# Patient Record
Sex: MALE | Race: OTHER | HISPANIC OR LATINO | URBAN - METROPOLITAN AREA
[De-identification: names, ages, dates, MRNs, and addresses within clinical notes are randomized per-mention and may not be internally consistent; named-entity substitution may affect disease eponyms.]

---

## 2021-02-01 ENCOUNTER — EMERGENCY (EMERGENCY)
Facility: HOSPITAL | Age: 41
LOS: 1 days | Discharge: HOME | End: 2021-02-01
Attending: EMERGENCY MEDICINE | Admitting: EMERGENCY MEDICINE
Payer: COMMERCIAL

## 2021-02-01 VITALS
SYSTOLIC BLOOD PRESSURE: 183 MMHG | TEMPERATURE: 97 F | HEART RATE: 63 BPM | RESPIRATION RATE: 18 BRPM | WEIGHT: 190.04 LBS | DIASTOLIC BLOOD PRESSURE: 121 MMHG | OXYGEN SATURATION: 99 %

## 2021-02-01 LAB
ALBUMIN SERPL ELPH-MCNC: 4.1 G/DL — SIGNIFICANT CHANGE UP (ref 3.3–5)
ALP SERPL-CCNC: 61 U/L — SIGNIFICANT CHANGE UP (ref 40–120)
ALT FLD-CCNC: 40 U/L — SIGNIFICANT CHANGE UP (ref 12–78)
ANION GAP SERPL CALC-SCNC: 6 MMOL/L — SIGNIFICANT CHANGE UP (ref 5–17)
AST SERPL-CCNC: 15 U/L — SIGNIFICANT CHANGE UP (ref 15–37)
BASOPHILS # BLD AUTO: 0.05 K/UL — SIGNIFICANT CHANGE UP (ref 0–0.2)
BASOPHILS NFR BLD AUTO: 1 % — SIGNIFICANT CHANGE UP (ref 0–2)
BILIRUB SERPL-MCNC: 0.7 MG/DL — SIGNIFICANT CHANGE UP (ref 0.2–1.2)
BUN SERPL-MCNC: 12 MG/DL — SIGNIFICANT CHANGE UP (ref 7–23)
CALCIUM SERPL-MCNC: 8.8 MG/DL — SIGNIFICANT CHANGE UP (ref 8.5–10.1)
CHLORIDE SERPL-SCNC: 108 MMOL/L — SIGNIFICANT CHANGE UP (ref 96–108)
CK SERPL-CCNC: 84 U/L — SIGNIFICANT CHANGE UP (ref 26–308)
CO2 SERPL-SCNC: 27 MMOL/L — SIGNIFICANT CHANGE UP (ref 22–31)
CREAT SERPL-MCNC: 1 MG/DL — SIGNIFICANT CHANGE UP (ref 0.5–1.3)
D DIMER BLD IA.RAPID-MCNC: <150 NG/ML DDU — SIGNIFICANT CHANGE UP
EOSINOPHIL # BLD AUTO: 0.22 K/UL — SIGNIFICANT CHANGE UP (ref 0–0.5)
EOSINOPHIL NFR BLD AUTO: 4.3 % — SIGNIFICANT CHANGE UP (ref 0–6)
GLUCOSE SERPL-MCNC: 88 MG/DL — SIGNIFICANT CHANGE UP (ref 70–99)
HCT VFR BLD CALC: 46.1 % — SIGNIFICANT CHANGE UP (ref 39–50)
HGB BLD-MCNC: 15.1 G/DL — SIGNIFICANT CHANGE UP (ref 13–17)
IMM GRANULOCYTES NFR BLD AUTO: 0.2 % — SIGNIFICANT CHANGE UP (ref 0–1.5)
LIDOCAIN IGE QN: 84 U/L — SIGNIFICANT CHANGE UP (ref 73–393)
LYMPHOCYTES # BLD AUTO: 2.06 K/UL — SIGNIFICANT CHANGE UP (ref 1–3.3)
LYMPHOCYTES # BLD AUTO: 40 % — SIGNIFICANT CHANGE UP (ref 13–44)
MCHC RBC-ENTMCNC: 27.7 PG — SIGNIFICANT CHANGE UP (ref 27–34)
MCHC RBC-ENTMCNC: 32.8 GM/DL — SIGNIFICANT CHANGE UP (ref 32–36)
MCV RBC AUTO: 84.6 FL — SIGNIFICANT CHANGE UP (ref 80–100)
MONOCYTES # BLD AUTO: 0.38 K/UL — SIGNIFICANT CHANGE UP (ref 0–0.9)
MONOCYTES NFR BLD AUTO: 7.4 % — SIGNIFICANT CHANGE UP (ref 2–14)
NEUTROPHILS # BLD AUTO: 2.43 K/UL — SIGNIFICANT CHANGE UP (ref 1.8–7.4)
NEUTROPHILS NFR BLD AUTO: 47.1 % — SIGNIFICANT CHANGE UP (ref 43–77)
NRBC # BLD: 0 /100 WBCS — SIGNIFICANT CHANGE UP (ref 0–0)
PLATELET # BLD AUTO: 236 K/UL — SIGNIFICANT CHANGE UP (ref 150–400)
POTASSIUM SERPL-MCNC: 3.7 MMOL/L — SIGNIFICANT CHANGE UP (ref 3.5–5.3)
POTASSIUM SERPL-SCNC: 3.7 MMOL/L — SIGNIFICANT CHANGE UP (ref 3.5–5.3)
PROT SERPL-MCNC: 7.6 G/DL — SIGNIFICANT CHANGE UP (ref 6–8.3)
RBC # BLD: 5.45 M/UL — SIGNIFICANT CHANGE UP (ref 4.2–5.8)
RBC # FLD: 14 % — SIGNIFICANT CHANGE UP (ref 10.3–14.5)
SODIUM SERPL-SCNC: 141 MMOL/L — SIGNIFICANT CHANGE UP (ref 135–145)
TROPONIN I SERPL-MCNC: <.015 NG/ML — SIGNIFICANT CHANGE UP (ref 0.01–0.04)
TROPONIN I SERPL-MCNC: <.015 NG/ML — SIGNIFICANT CHANGE UP (ref 0.01–0.04)
TSH SERPL-MCNC: 3.9 UIU/ML — HIGH (ref 0.36–3.74)
WBC # BLD: 5.15 K/UL — SIGNIFICANT CHANGE UP (ref 3.8–10.5)
WBC # FLD AUTO: 5.15 K/UL — SIGNIFICANT CHANGE UP (ref 3.8–10.5)

## 2021-02-01 PROCEDURE — 71045 X-RAY EXAM CHEST 1 VIEW: CPT | Mod: 26

## 2021-02-01 PROCEDURE — 99285 EMERGENCY DEPT VISIT HI MDM: CPT

## 2021-02-01 PROCEDURE — 93010 ELECTROCARDIOGRAM REPORT: CPT | Mod: 76

## 2021-02-01 RX ORDER — ASPIRIN/CALCIUM CARB/MAGNESIUM 324 MG
325 TABLET ORAL ONCE
Refills: 0 | Status: COMPLETED | OUTPATIENT
Start: 2021-02-01 | End: 2021-02-01

## 2021-02-01 RX ORDER — AMLODIPINE BESYLATE 2.5 MG/1
5 TABLET ORAL ONCE
Refills: 0 | Status: COMPLETED | OUTPATIENT
Start: 2021-02-01 | End: 2021-02-01

## 2021-02-01 RX ORDER — HYDRALAZINE HCL 50 MG
10 TABLET ORAL ONCE
Refills: 0 | Status: COMPLETED | OUTPATIENT
Start: 2021-02-01 | End: 2021-02-01

## 2021-02-01 RX ADMIN — AMLODIPINE BESYLATE 5 MILLIGRAM(S): 2.5 TABLET ORAL at 18:24

## 2021-02-01 RX ADMIN — Medication 325 MILLIGRAM(S): at 23:49

## 2021-02-01 RX ADMIN — Medication 10 MILLIGRAM(S): at 23:48

## 2021-02-01 RX ADMIN — AMLODIPINE BESYLATE 5 MILLIGRAM(S): 2.5 TABLET ORAL at 20:16

## 2021-02-01 NOTE — ED PROVIDER NOTE - NSFOLLOWUPINSTRUCTIONS_ED_ALL_ED_FT
Chest Pain    Chest pain can be caused by many different conditions which may or may not be dangerous. Causes include heartburn, lung infections, heart attack, blood clot in lungs, skin infections, strain or damage to muscle, cartilage, or bones, etc. In addition to a history and physical examination, an electrocardiogram (ECG) or other lab tests may have been performed to determine the cause of your chest pain. Follow up with your primary care provider or with a cardiologist as instructed.     SEEK IMMEDIATE MEDICAL CARE IF YOU HAVE ANY OF THE FOLLOWING SYMPTOMS: worsening chest pain, coughing up blood, unexplained back/neck/jaw pain, severe abdominal pain, dizziness or lightheadedness, fainting, shortness of breath, sweaty or clammy skin, vomiting, or racing heart beat. These symptoms may represent a serious problem that is an emergency. Do not wait to see if the symptoms will go away. Get medical help right away. Call 911 and do not drive yourself to the hospital.     Please return to Emergency room immediately for any worsening or persistent pain, shortness of breath, weakness, fever, abdominal pain, dizziness, passing out, unexplained pain in arms, legs, back, any vomiting, feeling like your heart is racing,  or any other concerning symptoms.    Follow up with Dr Siegel, Cardiologist within 1 week.  Take Amlodipine 10mg once daily for high blood pressure.

## 2021-02-01 NOTE — ED ADULT NURSE NOTE - NSIMPLEMENTINTERV_GEN_ALL_ED
Implemented All Universal Safety Interventions:  [place] to call system. Call bell, personal items and telephone within reach. Instruct patient to call for assistance. Room bathroom lighting operational. Non-slip footwear when patient is off stretcher. Physically safe environment: no spills, clutter or unnecessary equipment. Stretcher in lowest position, wheels locked, appropriate side rails in place.

## 2021-02-01 NOTE — ED PROVIDER NOTE - PATIENT PORTAL LINK FT
You can access the FollowMyHealth Patient Portal offered by [facility] by registering at the following website: [email or web address]. By joining [employer]’s FollowMyHealth portal, you will also be able to view your health information using other applications (apps) compatible with our system.

## 2021-02-01 NOTE — ED PROVIDER NOTE - PROGRESS NOTE DETAILS
pt with continued elevated blood pressures and although improved still with chest discomfort. Will continue to control BP. Will hold for am cardiology order. d/w dr myers cardiology,  pt clear for discharge, start pt on Amlodipine 10mg QD and will see pt in office.

## 2021-02-01 NOTE — ED ADULT NURSE REASSESSMENT NOTE - NS ED NURSE REASSESS COMMENT FT1
Report received from previous RN Carlos A. Pt resting comfortably, on telemetry monitor. Pt states the pain in his arm subsided. Will continue to monitor.

## 2021-02-01 NOTE — ED PROVIDER NOTE - PMH
Health Maintenance Summary     Topic Due On Due Status Completed On    Osteoporosis Screening  Completed Dec 18, 2015    Immunization-Zoster  Completed Aug 28, 2015    Immunization - Pneumococcal  Completed Jul 21, 2015    Medicare Wellness Visit Jul 22, 2017 Due On Jul 22, 2016    IMMUNIZATION - DTaP/Tdap/Td Jun 27, 2023 Not Due Jun 27, 2013    Immunization-Influenza Sep 1, 2017 Not Due Oct 1, 2015          Patient is up to date, no discussion needed. Doing it today       Renal colic

## 2021-02-01 NOTE — ED PROVIDER NOTE - OBJECTIVE STATEMENT
Pt is a 41 yo male who presents to the ED with a cc of chest pain. PMHx of seizure disorder as a child, h/o smoking. Pt reports that on Friday evening he developed chest pain. he reports that the pain is achy in nature and radiates into his left arm down to his elbow. The pain has been intermittent since then but today's episode occurred around 11 am and has been present since. He reports that walking actually seems to help the pain. Denies fever, chills, N/V/D/C, SOB, abd pain. Pt denies pleuritic component. He reports that he has no known underlying cardiac pathology but that there is a strong family history of cardiac disease on his mother's side. He has not seen a cardiologist. Denies trauma to the chest wall

## 2021-02-01 NOTE — ED PROVIDER NOTE - CLINICAL SUMMARY MEDICAL DECISION MAKING FREE TEXT BOX
Pt is a 39 yo male who presents to the ED with a cc of chest pain. PMHx of seizure disorder as a child, h/o smoking. Pt reports that on Friday evening he developed chest pain. he reports that the pain is achy in nature and radiates into his left arm down to his elbow. The pain has been intermittent since then but today's episode occurred around 11 am and has been present since. He reports that walking actually seems to help the pain. Denies fever, chills, N/V/D/C, SOB, abd pain. Pt denies pleuritic component. He reports that he has no known underlying cardiac pathology but that there is a strong family history of cardiac disease on his mother's side. He has not seen a cardiologist. Denies trauma to the chest wall Pt is a 39 yo male who presents to the ED with a cc of chest pain. PMHx of seizure disorder as a child, h/o smoking. Pt reports that on Friday evening he developed chest pain. he reports that the pain is achy in nature and radiates into his left arm down to his elbow. The pain has been intermittent since then but today's episode occurred around 11 am and has been present since. He reports that walking actually seems to help the pain. Denies fever, chills, N/V/D/C, SOB, abd pain. Pt denies pleuritic component. He reports that he has no known underlying cardiac pathology but that there is a strong family history of cardiac disease on his mother's side. He has not seen a cardiologist. Denies trauma to the chest wall. Concern for underlying cardiac pathology. Will obtain screening labs, troponin, check d dimer, EKG, chest x-ray. Will control BP

## 2021-02-01 NOTE — ED PROVIDER NOTE - CARE PLAN
Principal Discharge DX:	Hypertensive urgency   Principal Discharge DX:	Hypertensive urgency  Secondary Diagnosis:	Chest pain, unspecified type

## 2021-02-01 NOTE — ED PROVIDER NOTE - CARE PROVIDER_API CALL
Bruce Siegel)  Cardio [facility]  [address] [place] [address]  Phone: [phone]  Fax: [phone]  Follow Up Time: 4-6 Days

## 2021-02-02 VITALS
SYSTOLIC BLOOD PRESSURE: 171 MMHG | OXYGEN SATURATION: 100 % | RESPIRATION RATE: 18 BRPM | DIASTOLIC BLOOD PRESSURE: 98 MMHG | TEMPERATURE: 98 F | HEART RATE: 58 BPM

## 2021-02-02 LAB
SARS-COV-2 RNA SPEC QL NAA+PROBE: SIGNIFICANT CHANGE UP
TROPONIN I SERPL-MCNC: <.015 NG/ML — SIGNIFICANT CHANGE UP (ref 0.01–0.04)

## 2021-02-02 PROCEDURE — 85025 COMPLETE CBC W/AUTO DIFF WBC: CPT

## 2021-02-02 PROCEDURE — 83690 ASSAY OF LIPASE: CPT

## 2021-02-02 PROCEDURE — U0005: CPT

## 2021-02-02 PROCEDURE — U0003: CPT

## 2021-02-02 PROCEDURE — 84443 ASSAY THYROID STIM HORMONE: CPT

## 2021-02-02 PROCEDURE — 93005 ELECTROCARDIOGRAM TRACING: CPT

## 2021-02-02 PROCEDURE — 84484 ASSAY OF TROPONIN QUANT: CPT

## 2021-02-02 PROCEDURE — 80053 COMPREHEN METABOLIC PANEL: CPT

## 2021-02-02 PROCEDURE — 99284 EMERGENCY DEPT VISIT MOD MDM: CPT | Mod: 25

## 2021-02-02 PROCEDURE — 36415 COLL VENOUS BLD VENIPUNCTURE: CPT

## 2021-02-02 PROCEDURE — 96374 THER/PROPH/DIAG INJ IV PUSH: CPT

## 2021-02-02 PROCEDURE — 82550 ASSAY OF CK (CPK): CPT

## 2021-02-02 PROCEDURE — 71045 X-RAY EXAM CHEST 1 VIEW: CPT

## 2021-02-02 PROCEDURE — 99285 EMERGENCY DEPT VISIT HI MDM: CPT

## 2021-02-02 PROCEDURE — 85379 FIBRIN DEGRADATION QUANT: CPT

## 2021-02-02 RX ORDER — AMLODIPINE BESYLATE 2.5 MG/1
1 TABLET ORAL
Qty: 30 | Refills: 0
Start: 2021-02-02 | End: 2021-03-03

## 2021-02-02 NOTE — CONSULT NOTE ADULT - ATTENDING COMMENTS
I personally saw and examined the patient in detail.  I have spoken to the above provider regarding the assessment and plan of care.  I reviewed the above assessment and plan of care, and agree.  I have made changes in the body of the note where appropriate.  Outpatient stress and echo.  To start amlodipine 10 QD.  To follow with me in office after noninvasive evaluation.

## 2021-02-02 NOTE — CONSULT NOTE ADULT - SUBJECTIVE AND OBJECTIVE BOX
Patient is a 40y old  Male who presents with a chief complaint of chest pain    HPI: 41 yo PMH seizures as a child, presents to ED for chest pain that radiates down L arm. Onset Friday afternoon. Pain was intermittent over the weekend, however was persistent Monday which is why he came to ED. Patient currently asymptomatic, denies CP, radiating pain, SOB, but states the pain he was experiencing earlier was an ache that was not exacerbated or relieved with rest/exertion. Denies any unusual activity or exercise in the past week. Patient had a similar episode in December but pain completely resolved after a few days, so he did not seek treatment. Patient does admit to some increased stressors with work and family situation. Patient has not been to a PCP since 2019 and does not follow with any specialists. States at his last doctors visit in 2019 his BP and HR were low, states he was told he had hypertriglyceridemia however was told to work on lifestyle modifications and not started on a medication. Patient admits to eating fast food more than he would like.    PAST MEDICAL & SURGICAL HISTORY:  Renal colic with lithotripsy         MEDICATIONS  (STANDING):    MEDICATIONS  (PRN): Acetaminophen      FAMILY HISTORY:  HTN, HLD, DM      Constitutional: denies fever, chills  HEENT: denies blurry vision, difficulty hearing  Respiratory: denies SOB, SUAREZ, cough  Cardiovascular: denies CP, palpitations, orthopnea, LE edema  Gastrointestinal: denies nausea, vomiting, abdominal pain  Genitourinary: denies urinary changes  Skin: Denies rashes, itching  Neurologic: denies headache, weakness, dizziness        SOCIAL HISTORY:  Denies EtOH  Daily marijuana user  1ppd cigarettes       Vital Signs Last 24 Hrs  T(C): 36.6 (02 Feb 2021 06:07), Max: 36.7 (01 Feb 2021 21:39)  T(F): 97.9 (02 Feb 2021 06:07), Max: 98.1 (01 Feb 2021 21:39)  HR: 67 (02 Feb 2021 07:30) (47 - 89)  BP: 163/91 (02 Feb 2021 07:30) (156/73 - 204/93)  BP(mean): --  RR: 17 (02 Feb 2021 07:30) (16 - 20)  SpO2: 99% (02 Feb 2021 07:30) (99% - 100%)    Physical Exam:  General: Well developed, well nourished, NAD  HEENT: NCAT, EOMI bl, moist mucous membranes   Neck: Supple, nontender  Neurology: A&Ox3, answering questions appropriatley, sensation intact   Respiratory: CTA B/L, No W/R/R  CV: RRR, +S1/S2, no murmurs, rubs or gallops  Abdominal: Soft, NT, ND +BSx4  Extremities: No C/C/E, + peripheral pulses  MSK: Normal ROM, no joint erythema or warmth, no joint swelling   Heme: No obvious ecchymosis or petechiae   Skin: warm, dry, normal color      ECG: Sinus bradycardia, rate 57, no ischemic changes    I&O's Detail      LABS:                        15.1   5.15  )-----------( 236      ( 01 Feb 2021 17:53 )             46.1     02-01    141  |  108  |  12  ----------------------------<  88  3.7   |  27  |  1.00    Ca    8.8      01 Feb 2021 17:53    TPro  7.6  /  Alb  4.1  /  TBili  0.7  /  DBili  x   /  AST  15  /  ALT  40  /  AlkPhos  61  02-01    CARDIAC MARKERS ( 02 Feb 2021 06:06 )  <.015 ng/mL / x     / x     / x     / x      CARDIAC MARKERS ( 01 Feb 2021 22:05 )  <.015 ng/mL / x     / x     / x     / x      CARDIAC MARKERS ( 01 Feb 2021 17:53 )  <.015 ng/mL / x     / 84 U/L / x     / x              I&O's Summary    BNP  RADIOLOGY & ADDITIONAL STUDIES:  < from: Xray Chest 1 View- PORTABLE-Urgent (Xray Chest 1 View- PORTABLE-Urgent .) (02.01.21 @ 18:24) >  EXAM:  XR CHEST PORTABLE URGENT 1V                            PROCEDURE DATE:  02/01/2021          INTERPRETATION:  Portable chest radiograph    CLINICAL INFORMATION: Chest pain    TECHNIQUE:  Portable  AP view of the chest was obtained.    COMPARISON: No previous examinations are available for review.    FINDINGS:  The lungs are clear of airspace consolidations or effusions. No pneumothorax.    The heart and mediastinum are within normal limits.    Visualized osseous structures are intact.        IMPRESSION:   No evidence of active chest disease.

## 2021-02-02 NOTE — ED ADULT NURSE REASSESSMENT NOTE - NS ED NURSE REASSESS COMMENT FT1
Pt received sitting up in bed, pt awake, alert oriented x 4. Breathing even unlabored, abdomen soft non tender. c/o 4/10 chest pressure, MD Webb made aware. Pt on monitor NSR, hypertensive MD Webb made aware. No distress noted or expressed. CTM. Pt received sitting up in bed, pt awake, alert oriented x 4. Breathing even unlabored, abdomen soft non tender. c/o 4/10 chest pressure, MD Webb made aware. Pt on monitor NSR, hypertensive MD Webb made aware. No distress noted or expressed. Water provided per request, MD [email or web address].

## 2021-02-02 NOTE — CONSULT NOTE ADULT - ASSESSMENT
41 yo PMH seizures as a child, presents to ED for chest pain that radiates down L arm, also found to be hypertensive 204/93 in ED, now resolved.    - Patient is not complaining of any cardiac symptoms at this time.  - No clear evidence of acute ischemia, trops negative x 3  - EKG showing sinus bradycardia without any ischemic changes, no prior EKG to compare.  - CP likely exacerbated in setting of elevated BP and multiple lifestyle risk factors  - BP now improved s/p 10mg Hydralazine IVP x1, Amlodipine 5mg PO x2  -Recommend starting Amlodipine 10mg qd with outpatient follow up   -Discussed with patient the need for cardiology follow up within 1 week for outpatient ischemic workup including stress test and TTE  -Discussed extensively need for modification of lifestyle factors including lessening fast food consumption, incorporating exercise into daily regimen, smoking cessation                   39 yo PMH seizures as a child, presents to ED for chest pain that radiates down L arm, also found to be hypertensive 204/93 in ED, now resolved.    - Patient is not complaining of any cardiac symptoms at this time.  - No clear evidence of acute ischemia, trops negative x 3  - EKG showing sinus bradycardia without any ischemic changes, no prior EKG to compare.  - CP likely exacerbated in setting of elevated BP   - BP now improved s/p 10mg Hydralazine IVP x1, Amlodipine 5mg PO x2  -Recommend starting Amlodipine 10mg qd with outpatient follow up   -Discussed with patient the need for cardiology follow up within 1 week for outpatient ischemic workup including stress test and TTE  -Discussed extensively need for modification of lifestyle factors including lessening fast food consumption, incorporating exercise into daily regimen, smoking cessation

## 2021-02-02 NOTE — ED ADULT NURSE REASSESSMENT NOTE - NS ED NURSE REASSESS COMMENT FT1
Pt was on phone, after phone call reports chest pain, bilateral arm tingling and anxiety related to phone call. Pt encouraged to take deep breaths, reassurance given, EKG repeated. MD Singh evaluated pt at bedside. Will continue to monitor.

## 2021-05-25 NOTE — ED PROVIDER NOTE - ENMT NEGATIVE STATEMENT, MLM
Ears: no ear pain and no hearing problems. Nose: no nasal congestion and no nasal drainage. Mouth/Throat: no dysphagia, no hoarseness and no throat pain. Neck: no lumps, no pain, no stiffness and no swollen glands. soft/nondistended/nontender

## 2022-01-01 NOTE — ED PROVIDER NOTE - QUALITY
Anus position normal and patency confirmed, rectal-cutaneous fistula absent, normal anal wink. see above